# Patient Record
Sex: MALE | Race: WHITE
[De-identification: names, ages, dates, MRNs, and addresses within clinical notes are randomized per-mention and may not be internally consistent; named-entity substitution may affect disease eponyms.]

---

## 2020-06-23 ENCOUNTER — HOSPITAL ENCOUNTER (OUTPATIENT)
Dept: HOSPITAL 53 - M RAD | Age: 45
End: 2020-06-23
Attending: INTERNAL MEDICINE
Payer: COMMERCIAL

## 2020-06-23 DIAGNOSIS — R91.8: Primary | ICD-10-CM

## 2020-06-23 NOTE — REP
REASON:  Followup left lower lobe nodule.

 

COMPARISON:  CT 01/20/2020.

 

The mediastinum and pulmonary salvador are essentially unchanged.  Once again, the

aortic root is upper limits of normal to mildly dilated with an approximate

maximal dimension of 4.9 cm.  This is essentially unchanged from the prior exam

but difficult to evaluate without intravenous contrast. There are no pleural or

pericardial effusions.  The imaged upper abdomen shows a 6 mm sized

nonobstructing left nephrolith status quo but only partially imaged on the prior

exam. There is no change in the osseous structures.  Evaluation of the lung

fields shows the irregular nodule in the left lower lobe to be calcifying.  The

larger left lower lobe nodule also appears to be developing central

calcifications with pinpoint density readings positive 200-300 range.  There are

no new abnormal nodules, masses or opacities.

 

IMPRESSION:

 

1.  Interval calcifications and suspected calcifications in left lower lobe

nodules as described above favoring benignity rather than malignancy.

Recommendation for additional followup with PET/CT made during the interpretation

of the 03/30/2020 PET/CT is not obviated by today's findings.  It should be

stated that distinct left hilar calcifications are also noted additionally

suggestive of a benign process.

 

2.  Maximal dimension aortic root as described above even with the wide range of

normalcy depending on weight and gender, I am somewhat concerned that there is

abnormal dilatation and would suggest contrast-enhanced followup with appropriate

consultation.

 

 

Electronically Signed by

Sukhdeep Wesley DO 06/23/2020 05:21 P

## 2020-08-25 ENCOUNTER — HOSPITAL ENCOUNTER (OUTPATIENT)
Dept: HOSPITAL 53 - M RAD | Age: 45
End: 2020-08-25
Attending: INTERNAL MEDICINE
Payer: COMMERCIAL

## 2020-08-25 DIAGNOSIS — R91.8: Primary | ICD-10-CM

## 2020-08-25 DIAGNOSIS — E04.1: ICD-10-CM

## 2020-09-30 NOTE — REP
THYROID ULTRASOUND



CLINICAL: Thyroid nodule. 



TECHNIQUE: Real-time gray scale and color evaluation using linear high frequency
transducer. 



FINDINGS: 



The right thyroid lobe is normal in contour, size, and echogenicity measuring 
4.6 x 1.5 x 1.2 cm without cyst or nodule. The isthmus measures 2.4 mm in width.
The left thyroid lobe measures 4.3 x 1.4 x 1.0 cm and includes three complex 
cystic lesions with small mural nodules measuring 7.1 x 4.7 x 3.4 mm, 4.5 x 4.0 
x 4.4 mm, and 4.6 x 4.5 x 4.5 mm. Findings are likely benign in appearance. 



IMPRESSION: 



Three complex cystic lesions in the left thyroid lobe, likely benign. 

MTDD

## 2020-09-30 NOTE — REP
CONTRAST ENHANCED CHEST CT



CLINICAL: Follow up pulmonary nodule. 



TECHNIQUE: Axial contrast enhanced images from the thoracic inlet to the upper 
abdomen with coronal and sagittal reformations using 75 cc Isovue-370 
intravenous contrast material. 



COMPARISON: 06/23/2020, 01/20/2020



FINDINGS: 



Small partially calcified nodular densities along the anterior margin of the 
left lower lobe (image 52) as well as centrally calcifying nodule in the left 
lower lobe sulcus measuring 18 mm (image 90) remain stable. Calcified left hilar
lymph nodes are also identified, and these findings likely represent sequelae of
granulomatous disease. The lung fields are otherwise well aerated and clear. No 
further consolidation, significant nodule, or mass lesion. Tracheobronchial tree
is patent. No axillary, hilar, or mediastinal adenopathy noted. Aneurysmal 
dilatation of the ascending thoracic aorta measuring 4.9 cm at the aortic root 
is again noted and unchanged. No obvious dissection. Pulmonary vasculature and 
heart/pericardium appear normal. Surrounding musculoskeletal structures are 
intact. Limited upper abdomen demonstrates normal bilateral adrenal glands. 



IMPRESSION: 

* Stable appearance to the partially calcified left lower lobe nodules and lymph
  nodes consistent with prior granulomatous disease. 

* No acute significant mediastinal or pleuroparenchymal process otherwise 
  appreciated. 

* Aneurysmal dilatation to the ascending thoracic aorta unchanged at 4.9 cm 
  diameter. 

 

MTDD

## 2021-04-20 ENCOUNTER — HOSPITAL ENCOUNTER (OUTPATIENT)
Dept: HOSPITAL 53 - M RAD | Age: 46
End: 2021-04-20
Attending: INTERNAL MEDICINE
Payer: COMMERCIAL

## 2021-04-20 DIAGNOSIS — R91.1: Primary | ICD-10-CM

## 2021-04-20 NOTE — REP
INDICATION:

LEFT LUNG NODULE.



COMPARISON:

Multiple the latest 08/25/2020



TECHNIQUE:

Helical CT scanning of the chest after the intravenous administration of 100 cc Isovue

370.



FINDINGS:

The mediastinum and pulmonary salvador are unchanged.  No mass or adenopathy has

developed.  There are no pleural or pericardial effusions.  The maximal AP dimension

of the ascending aorta is 4.9 cm this is unchanged from the 01/20/2020 exam.  There

are no pleural or pericardial effusions.  There is no change in the imaged upper

abdomen or imaged osseous structures.



Evaluation of the lung fields shows no new abnormal nodules, masses, or opacities.



IMPRESSION:

Stable CT findings as described above.





<Electronically signed by Sukhdeep Wesley > 04/20/21 [id number]

## 2021-07-13 ENCOUNTER — HOSPITAL ENCOUNTER (OUTPATIENT)
Dept: HOSPITAL 53 - M RAD | Age: 46
End: 2021-07-13
Attending: INTERNAL MEDICINE
Payer: COMMERCIAL

## 2021-07-13 DIAGNOSIS — R91.8: Primary | ICD-10-CM

## 2021-07-13 PROCEDURE — 71260 CT THORAX DX C+: CPT

## 2021-07-13 NOTE — REP
INDICATION:

ABN FINDINGS OF LUNG FIELD



COMPARISON:

Multiple examinations dating through 06/23/2020



TECHNIQUE:

Axial contrast enhanced images from the thoracic inlet to the upper abdomen with

coronal and sagittal reformations using 75 ml Isovue 370 intravenous contrast material.



This CT examination was performed using the following dose reduction techniques:

Automated exposure control, adjustment of mA and/or kv according to the patient's

size, and use of iterative reconstruction technique.



FINDINGS:

Bilateral lung fields are well aerated and essentially clear.  No acute nodule,

consolidation, mass, effusion, or pneumothorax.  Small densities in the anterior left

lower lobe adjacent to the fissure as well as 1.5 cm mass in the left lower lobe

remains stable through 01/20/2020 and prior PET-CT demonstrated no hypermetabolic

activity.  No significant adenopathy.  Tracheobronchial tree is patent.  Aneurysmal

dilatation to the ascending thoracic aorta measures approximately 4.8 cm maximal

diameter without evidence for dissection.  No cardiomegaly or pericardial effusion.

Surrounding musculoskeletal structures are intact.



IMPRESSION:

1. Known pulmonary nodules remain stable and prior PET-CT demonstrated no associated

hypermetabolic activity.

2. No acute pulmonary parenchymal or pleural process appreciated.

3. Stable aneurysmal dilatation to the ascending thoracic aorta without dissection.

No cardiomegaly.







<Electronically signed by Riley Coles > 07/13/21 [id number]

## 2022-03-28 ENCOUNTER — HOSPITAL ENCOUNTER (OUTPATIENT)
Dept: HOSPITAL 53 - M RAD | Age: 47
End: 2022-03-28
Attending: NURSE PRACTITIONER
Payer: COMMERCIAL

## 2022-03-28 DIAGNOSIS — I71.2: ICD-10-CM

## 2022-03-28 DIAGNOSIS — R91.1: Primary | ICD-10-CM

## 2022-03-28 PROCEDURE — 71260 CT THORAX DX C+: CPT

## 2022-07-20 ENCOUNTER — HOSPITAL ENCOUNTER (OUTPATIENT)
Dept: HOSPITAL 53 - M RAD | Age: 47
End: 2022-07-20
Attending: INTERNAL MEDICINE
Payer: COMMERCIAL

## 2022-07-20 DIAGNOSIS — N20.0: ICD-10-CM

## 2022-07-20 DIAGNOSIS — R91.8: Primary | ICD-10-CM
